# Patient Record
Sex: FEMALE | Race: WHITE | NOT HISPANIC OR LATINO | URBAN - METROPOLITAN AREA
[De-identification: names, ages, dates, MRNs, and addresses within clinical notes are randomized per-mention and may not be internally consistent; named-entity substitution may affect disease eponyms.]

---

## 2017-01-18 ENCOUNTER — OFFICE VISIT - HEALTHEAST (OUTPATIENT)
Dept: CARDIOLOGY | Facility: CLINIC | Age: 82
End: 2017-01-18

## 2017-01-18 ENCOUNTER — AMBULATORY - HEALTHEAST (OUTPATIENT)
Dept: CARDIOLOGY | Facility: CLINIC | Age: 82
End: 2017-01-18

## 2017-01-18 DIAGNOSIS — I25.10 ATHEROSCLEROSIS OF NATIVE CORONARY ARTERY OF NATIVE HEART WITHOUT ANGINA PECTORIS: ICD-10-CM

## 2017-01-18 DIAGNOSIS — R06.09 DYSPNEA ON EXERTION: ICD-10-CM

## 2017-01-18 DIAGNOSIS — I10 ESSENTIAL HYPERTENSION WITH GOAL BLOOD PRESSURE LESS THAN 130/80: ICD-10-CM

## 2017-01-18 DIAGNOSIS — I10 ESSENTIAL HYPERTENSION: ICD-10-CM

## 2017-01-18 DIAGNOSIS — Z71.6 TOBACCO ABUSE COUNSELING: ICD-10-CM

## 2017-01-18 LAB
ATRIAL RATE - MUSE: 84 BPM
DIASTOLIC BLOOD PRESSURE - MUSE: NORMAL MMHG
INTERPRETATION ECG - MUSE: NORMAL
P AXIS - MUSE: 76 DEGREES
PR INTERVAL - MUSE: 132 MS
QRS DURATION - MUSE: 120 MS
QT - MUSE: 416 MS
QTC - MUSE: 491 MS
R AXIS - MUSE: 49 DEGREES
SYSTOLIC BLOOD PRESSURE - MUSE: NORMAL MMHG
T AXIS - MUSE: 32 DEGREES
VENTRICULAR RATE- MUSE: 84 BPM

## 2017-01-18 ASSESSMENT — MIFFLIN-ST. JEOR: SCORE: 1031.43

## 2017-09-24 ENCOUNTER — COMMUNICATION - HEALTHEAST (OUTPATIENT)
Dept: CARDIOLOGY | Facility: CLINIC | Age: 82
End: 2017-09-24

## 2017-09-24 DIAGNOSIS — I10 ESSENTIAL HYPERTENSION WITH GOAL BLOOD PRESSURE LESS THAN 130/80: ICD-10-CM

## 2017-09-24 DIAGNOSIS — I25.10 ATHEROSCLEROSIS OF NATIVE CORONARY ARTERY OF NATIVE HEART WITHOUT ANGINA PECTORIS: ICD-10-CM

## 2018-01-01 ENCOUNTER — HOME CARE/HOSPICE - HEALTHEAST (OUTPATIENT)
Dept: HOSPICE | Facility: HOSPICE | Age: 83
End: 2018-01-01

## 2018-01-01 ENCOUNTER — OFFICE VISIT - HEALTHEAST (OUTPATIENT)
Dept: GERIATRICS | Facility: CLINIC | Age: 83
End: 2018-01-01

## 2018-01-01 ENCOUNTER — AMBULATORY - HEALTHEAST (OUTPATIENT)
Dept: HOSPICE | Facility: HOSPICE | Age: 83
End: 2018-01-01

## 2018-01-01 ENCOUNTER — AMBULATORY - HEALTHEAST (OUTPATIENT)
Dept: GERIATRICS | Facility: CLINIC | Age: 83
End: 2018-01-01

## 2018-01-01 ENCOUNTER — COMMUNICATION - HEALTHEAST (OUTPATIENT)
Dept: ADMINISTRATIVE | Facility: CLINIC | Age: 83
End: 2018-01-01

## 2018-01-01 DIAGNOSIS — I63.9 CVA (CEREBRAL VASCULAR ACCIDENT) (H): ICD-10-CM

## 2018-01-01 DIAGNOSIS — I63.9 CEREBROVASCULAR ACCIDENT (CVA), UNSPECIFIED MECHANISM (H): ICD-10-CM

## 2018-01-01 DIAGNOSIS — I10 HYPERTENSION: ICD-10-CM

## 2018-01-01 DIAGNOSIS — R29.6 MULTIPLE FALLS: ICD-10-CM

## 2018-01-01 DIAGNOSIS — J44.9 COPD (CHRONIC OBSTRUCTIVE PULMONARY DISEASE) (H): ICD-10-CM

## 2018-01-01 DIAGNOSIS — G25.0 ESSENTIAL TREMOR: ICD-10-CM

## 2018-01-01 DIAGNOSIS — R06.02 SOB (SHORTNESS OF BREATH): ICD-10-CM

## 2018-01-01 DIAGNOSIS — I73.9 PERIPHERAL VASCULAR DISEASE (H): ICD-10-CM

## 2018-01-01 DIAGNOSIS — E46 PROTEIN MALNUTRITION (H): ICD-10-CM

## 2018-01-01 DIAGNOSIS — I25.10 CORONARY ARTERY DISEASE: ICD-10-CM

## 2018-01-01 DIAGNOSIS — R62.7 FTT (FAILURE TO THRIVE) IN ADULT: ICD-10-CM

## 2018-01-01 DIAGNOSIS — R09.02 HYPOXIA: ICD-10-CM

## 2018-01-01 RX ORDER — HALOPERIDOL 2 MG/ML
SOLUTION ORAL
Status: SHIPPED | COMMUNITY
Start: 2018-01-01

## 2018-01-01 RX ORDER — LORAZEPAM 0.5 MG/1
0.5 TABLET ORAL EVERY 4 HOURS PRN
Status: SHIPPED | COMMUNITY
Start: 2018-01-01

## 2018-01-01 RX ORDER — BISACODYL 10 MG
10 SUPPOSITORY, RECTAL RECTAL DAILY PRN
Status: SHIPPED | COMMUNITY
Start: 2018-01-01

## 2018-01-01 RX ORDER — BISACODYL 10 MG
10 SUPPOSITORY, RECTAL RECTAL
Status: SHIPPED | COMMUNITY
Start: 2018-01-01

## 2018-01-01 RX ORDER — ATROPINE SULFATE 10 MG/ML
SOLUTION/ DROPS OPHTHALMIC
Status: SHIPPED | COMMUNITY
Start: 2018-01-01

## 2018-01-01 RX ORDER — MORPHINE SULFATE 100 MG/5ML
5 SOLUTION ORAL SEE ADMIN INSTRUCTIONS
Status: SHIPPED | COMMUNITY
Start: 2018-01-01

## 2018-01-01 RX ORDER — IPRATROPIUM BROMIDE AND ALBUTEROL SULFATE 2.5; .5 MG/3ML; MG/3ML
3 SOLUTION RESPIRATORY (INHALATION) SEE ADMIN INSTRUCTIONS
Status: SHIPPED | COMMUNITY
Start: 2018-01-01

## 2018-01-01 ASSESSMENT — MIFFLIN-ST. JEOR: SCORE: 1068.17

## 2018-10-25 ENCOUNTER — HOME CARE/HOSPICE - HEALTHEAST (OUTPATIENT)
Dept: HOSPICE | Facility: HOSPICE | Age: 83
End: 2018-10-25

## 2021-05-24 ENCOUNTER — RECORDS - HEALTHEAST (OUTPATIENT)
Dept: ADMINISTRATIVE | Facility: CLINIC | Age: 86
End: 2021-05-24

## 2021-05-28 ENCOUNTER — RECORDS - HEALTHEAST (OUTPATIENT)
Dept: ADMINISTRATIVE | Facility: CLINIC | Age: 86
End: 2021-05-28

## 2021-05-30 ENCOUNTER — RECORDS - HEALTHEAST (OUTPATIENT)
Dept: ADMINISTRATIVE | Facility: CLINIC | Age: 86
End: 2021-05-30

## 2021-05-30 VITALS — HEIGHT: 62 IN | BODY MASS INDEX: 26.5 KG/M2 | WEIGHT: 144 LBS

## 2021-06-02 VITALS — BODY MASS INDEX: 27.99 KG/M2 | HEIGHT: 62 IN | WEIGHT: 152.1 LBS

## 2021-06-08 NOTE — PROGRESS NOTES
[facility] Office Note    Assessment / Plan:    1.  Coronary artery disease.  Stable with no recent anginal episodes.  We'll recheck pharmacologic nuclear stress testing to be sure that worsening exertional dyspnea is not an anginal equivalent  2.  Hypertension.  Inadequate control at the present time.  Will increase amlodipine to 10 mg daily  3.  Tobacco abuse.  Smoking cessation was discussed at length, assistance offered but declined    Follow up 1 year if stress testing shows no significant ischemia    ______________________________________________________________________    Subjective:    I had the opportunity to see Ana Momin at the [facility]. Ana Momin is a 85 y.o. female with a history of coronary artery disease status post stent ×2 in June 2000.  She also has a significant history of chronic restrictive pulmonary disease.  She has peripheral arterial disease and a history of palpitations.      She also has a history of hypertension, her blood pressure has been somewhat resistant to treatment.  She is accompanied by her daughter, once again     He has gained 2 pounds over the last year following her recovery from her bleeding episode last year.  She walks very little, using her walker, and notes that she is quite short of breath now when reaching the top of the steps.  Her activity tolerance is decreased over the last year.  She denies any chest pain.  She does feel her heart race at times under any kind of stress or with activity.  This is generally a regular, rapid heart beat.    She's had no chest pain.  Uses a cane to avoid falling.  She denies claudication but has occasional nocturnal cramps in her legs.  She has chronic orthopnea and over the last several months has had paroxysmal nocturnal dyspnea about 4 AM.  She has no chest pain associated with this.  No awareness of palpitations She denies peripheral edema    In 2013 a pharmacologic nuclear stress  test suggested inferior ischemia.  Subsequent coronary angiography revealed moderate in-stent restenosis on the right side but no significant stenosis demonstrated throughout the coronary circulation.    ______________________________________________________________________    Problem List:  Patient Active Problem List   Diagnosis     Dyslipidemia     Coronary Artery Disease     Peripheral Vascular Disease     Osteoarthritis     Hypertension     Medical History:  Past Medical History   Diagnosis Date     COPD (chronic obstructive pulmonary disease)      Coronary artery disease      Hypertension      Surgical History:  Past Surgical History   Procedure Laterality Date     Coronary stent placement  6/2000     Reba     Social History:  Social History     Social History     Marital status:      Spouse name: N/A     Number of children: N/A     Years of education: N/A     Occupational History     Not on file.     Social History Main Topics     Smoking status: Current Every Day Smoker     Smokeless tobacco: Not on file     Alcohol use Not on file     Drug use: Not on file     Sexual activity: Not on file     Other Topics Concern     Not on file     Social History Narrative     Sleep history: Reports daytime sleepiness, and paroxysmal nocturnal dyspnea.  She takes an inhaler with some improvement when she awakens  Exercise History:  Limited walking    Review of Systems:   General: WNL  Eyes: WNL  Ears/Nose/Throat: WNL  Lungs: Shortness of Breath, Wheezing  Heart: Shortness of Breath with activity  Stomach: WNL  Bladder: WNL  Muscle/Joints: WNL  Skin: WNL  Nervous System: Loss of Balance  Mental Health: Anxiety     Blood: WNL          Family History:  Family History   Problem Relation Age of Onset     Acute Myocardial Infarction Neg Hx          Allergies:  No Known Allergies  Medications:  Current Outpatient Prescriptions   Medication Sig Dispense Refill     albuterol (PROVENTIL HFA;VENTOLIN HFA) 90 mcg/actuation  "inhaler Inhale 2 puffs every 6 (six) hours as needed for wheezing.       albuterol (PROVENTIL) 2.5 mg /3 mL (0.083 %) nebulizer solution Inhale 2.5 mg as needed.       amLODIPine (NORVASC) 5 MG tablet TAKE ONE TABLET BY MOUTH ONE TIME DAILY 90 tablet 1     aspirin 81 MG EC tablet Take 81 mg by mouth daily.        atorvastatin (LIPITOR) 80 MG tablet Take 40 mg by mouth.       cholecalciferol, vitamin D3, (VITAMIN D3) 2,000 unit cap Take 2,000 Units by mouth daily.       clopidogrel (PLAVIX) 75 mg tablet Take 75 mg by mouth daily.        ferrous sulfate (IRON) 325 (65 FE) MG tablet Take 325 mg by mouth 2 (two) times a day.        ipratropium-albuterol (DUO-NEB) 0.5-2.5 mg/3 mL nebulizer Inhale 3 mL as needed.        metoprolol succinate (TOPROL-XL) 50 MG 24 hr tablet Take 50 mg by mouth daily.       nitroglycerin (NITROSTAT) 0.4 MG SL tablet Place 0.4 mg under the tongue every 5 (five) minutes as needed.        escitalopram oxalate (LEXAPRO) 20 MG tablet Take 10 mg by mouth daily.        No current facility-administered medications for this visit.        Objective:   Wt Readings from Last 3 Encounters:   01/18/17 144 lb (65.3 kg)   12/15/15 142 lb (64.4 kg)     Vital signs:  Visit Vitals     /64 (Patient Site: Right Arm, Patient Position: Sitting, Cuff Size: Adult Regular)     Pulse 80     Resp 16     Ht 5' 2\" (1.575 m)     Wt 144 lb (65.3 kg)     BMI 26.34 kg/m2         Physical Exam:    Eyes    Conjunctiva Findings: bilateral hyperemia.   Sclerae: Clear, nonicteric.   ENT   Mouth: Oral mucuous membranes are pink and moist   Neck   Carotid pulses: Carotid pulses palpaple with normal contours and symmetric, no bruits.   Jugular Veins: Normal jugular venous pressure.   Thyroid: No visible thyromegaly.   Pulmonary    No wheezing , decreased breath sounds with prolonged expiratory time.   Cardiovascular   Auscultation of heart: Regular rhythm, normal S1 and S2, no murmurs, clicks or rubs.   Pulses: Normal "   Extremities: No edema or clubbing.   Gastrointestinal   Abdomen: Non-tender, no masses noted.   Musculoskeletal   Spine: Inspection of the back showed moderate kyphosis.   Skin   Skin and subcutaneous tissue: No xanthelasma.   Neurologic   Orientation to person, place and time: Normal.   Psychiatric   Mood and affect: Normal.       Lab Results:  LIPIDS:  Lab Results   Component Value Date    CHOL 152 10/08/2013    CHOL 108 08/21/2013     Lab Results   Component Value Date    HDL 41 10/08/2013    HDL 41 08/21/2013     Lab Results   Component Value Date    LDLCALC 77 10/08/2013    LDLCALC 50 08/21/2013       ECG today shows sinus rhythm, right bundle branch block which is new versus previous exam from 3 years ago          FRANCINE HENAO MD [facility]  [phone]    This note created using Dragon voice recognition software.  Sound alike errors may have escaped editing.

## 2021-06-15 PROBLEM — R06.09 DYSPNEA ON EXERTION: Status: ACTIVE | Noted: 2017-01-18

## 2021-06-15 PROBLEM — Z71.6 TOBACCO ABUSE COUNSELING: Status: ACTIVE | Noted: 2017-01-18

## 2021-06-16 PROBLEM — R53.1 WEAKNESS: Status: ACTIVE | Noted: 2018-01-01

## 2021-06-16 PROBLEM — R29.6 FALLS: Status: ACTIVE | Noted: 2018-01-01

## 2021-06-16 PROBLEM — I63.9 CEREBRAL INFARCTION (H): Status: ACTIVE | Noted: 2018-01-01

## 2021-06-16 PROBLEM — I63.9 CVA (CEREBRAL VASCULAR ACCIDENT) (H): Status: ACTIVE | Noted: 2018-01-01

## 2021-06-16 PROBLEM — N30.00 ACUTE CYSTITIS WITHOUT HEMATURIA: Status: ACTIVE | Noted: 2018-01-01

## 2021-06-16 PROBLEM — G25.0 ESSENTIAL TREMOR: Chronic | Status: ACTIVE | Noted: 2018-01-01

## 2021-06-16 PROBLEM — J44.9 COPD (CHRONIC OBSTRUCTIVE PULMONARY DISEASE) (H): Status: ACTIVE | Noted: 2018-01-01

## 2021-06-16 PROBLEM — E46 PROTEIN MALNUTRITION (H): Status: ACTIVE | Noted: 2018-01-01

## 2021-06-20 NOTE — PROGRESS NOTES
[facility]    Facility:   [facility] [[id number]]   Code Status: DNR      CHIEF COMPLAINT/REASON FOR VISIT:  Chief Complaint   Patient presents with     Follow Up     rehab, copd.        HISTORY:      HPI: Ana is a 87 y.o. female who I was asked to see secondary to most recent hospitalization September 25, 2018 secondary to a history of multiple falls.  The CT of the head did not show any acute changes masses infarcts or hemorrhage.  There were age-related changes along with the encephalomalacia of the posterior right temporal to right occipital lobes.  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  She is on statin aspirin and Plavix.  She also did have a probable UTI was started on Ancef.  She also does have stable COPD she does have chronic respiratory failure with hypoxia does use oxygen while at home along with nebulizations and prednisone 10 mg daily.  She has a history of CAD status post cardiac stenting x2 in June 2000 continue with current medications.  She does have an essential tremor and was placed on primidone 50 mg.  The thyroid was within normal limits.  She was admitted secondary to multiple recent falls in the past 6 months.  She does have poor balance and a tendency to lean to the left side she denied any dizziness or lightheadedness no loss of consciousness.  She does live with her daughter.  Her troponins were negative.  Does have iron deficiency anemia the iron was 16 transferrin of 84.  She was normotensive and also afebrile.  She has been on the transitional care unit and after being here approximately a week and continues to be on oxygen and having shortness of breath poor balance tremors generalized weakness and failure to thrive both she and her family have decided they like to pursue hospice care secondary to her chronic medical  conditions including her end-stage COPD along with a history of CAD and a recent right subacute CVA.  I had a chance to speak with her regarding this decision and she is very much at peace with this decision to go on hospice care we did talk about the services involved as well as changing up some of her medications and she is okay with discontinuing currently in the 8 atorvastatin the ferrous sulfate vitamin D3 and aspirin.  For the shortness of breath as well as the anxiety we will give her Ativan as needed as well as Roxanol as needed.  The meantime she will continue with duo nebs 3 times a day also does have Pulmicort rest pills twice daily she is also on Ensure twice daily for protein malnutrition.  Also continue to monitor her blood pressures and overall status.  She is on furosemide 10 mg every other day that has been stable and her weight has also been stable at 152 pounds.  She is also finishing up her prednisone taper.  She is not having any discomfort at this time.  She occasionally does take an extra albuterol neb as needed.  She feels her appetite only be fair and she does feel that she is using a lot of unnecessary energy with her breathing as well as her overall status and she is very much aware of her prognosis.  In talking with the  they did mention to the family that we will be in today I have not met with family yet and when they do come and I will meet with them to discuss this plan of care as well but according to the  the family is very pleased that we would like to set up a hospice consult and that was their intention as well.    Past Medical History:   Diagnosis Date     COPD (chronic obstructive pulmonary disease) (H)      Coronary artery disease      Hypertension              Family History   Problem Relation Age of Onset     Acute Myocardial Infarction Neg Hx      Social History     Social History     Marital status:      Spouse name: N/A     Number of  children: N/A     Years of education: N/A     Social History Main Topics     Smoking status: Current Every Day Smoker     Smokeless tobacco: Current User     Alcohol use No     Drug use: No     Sexual activity: Not on file     Other Topics Concern     Not on file     Social History Narrative         Review of Systems  Currently she denies chills and fever URI or flulike symptoms headache stiff neck swollen glands unusual myalgias.  Does have a history of COPD CAD hypertension PVD generalized weakness and recent fall with a subacute rightCVA with left weakness.  .  Vitals:    10/09/18 0836   BP: 128/75   Pulse: 86   Resp: 20   Temp: 98.5  F (36.9  C)       Physical Exam    Constitutional: No distress.   HENT: Neck: Neck supple. No thyromegaly present.   Cardiovascular: Normal rate, regular rhythm and normal heart sounds.    Pulmonary/Chest: No respiratory distress. She has wheezes.   History of COPD.  Chronic wheezes.  Oxygen dependent.  Smoker.   Abdominal: Bowel sounds are normal. There is no tenderness. There is no guarding.   Musculoskeletal:   Tremors.  Generalized weakness.  Poor balance.  Recent subacute right CVA   Lymphadenopathy:     She has no cervical adenopathy.   Neurological: She is alert.   Skin: Skin is warm and dry. No rash noted.   Psychiatric: Her behavior is normal.   LABS:   Lab Results   Component Value Date    WBC 10.4 09/29/2018    HGB 13.4 09/29/2018    HCT 42.5 09/29/2018    MCV 99 09/29/2018     09/29/2018     Results for orders placed or performed during the hospital encounter of 09/25/18   Basic Metabolic Panel   Result Value Ref Range    Sodium 140 136 - 145 mmol/L    Potassium 4.3 3.5 - 5.0 mmol/L    Chloride 103 98 - 107 mmol/L    CO2 28 22 - 31 mmol/L    Anion Gap, Calculation 9 5 - 18 mmol/L    Glucose 121 70 - 125 mg/dL    Calcium 9.6 8.5 - 10.5 mg/dL    BUN 28 8 - 28 mg/dL    Creatinine 1.09 0.60 - 1.10 mg/dL    GFR MDRD Af Amer 58 (L) >60 mL/min/1.73m2    GFR MDRD Non Af  Amer 48 (L) >60 mL/min/1.73m2           ASSESSMENT:      ICD-10-CM    1. COPD (chronic obstructive pulmonary disease) (H) J44.9    2. FTT (failure to thrive) in adult R62.7    3. Hypoxia R09.02    4. SOB (shortness of breath) R06.02        PLAN:    At this time we will initiate a hospice consult per their request.  Also discontinue the atorvastatin the ferrous sulfate the vitamin D3 and aspirin will also add Ativan 0.5 mg every 6 hours as needed for anxiety and shortness of breath as well as Roxanol 0.25 mL every 2 hours as needed for pain and shortness of breath.  When the family does come and also meet with them to discuss this current plan of care also will send the face sheet [facility] to [facility] for further evaluation and consultation.  She will continue with her current nebulizations as well as other medications as listed above.  At this point the patient is pleased so far with the conversation as well as the current plan of care.    For documentation purposes total visit was over 40 minutes to which over 50% was spent with the patient going over her care her questions her medications her decision and coordination of care efforts.      Electronically signed by: Michael Duane Johnson, CNP

## 2021-06-20 NOTE — PROGRESS NOTES
Code Status:  DNR/DNI  Visit Type: H & P     Facility:  [facility] [[id number]]      Facility Type: SNF (Skilled Nursing Facility, TCU)    History of Present Illness:   Hospital Admission Date: September 25, 2018 [facility] Hospital Discharge Date: October first 2018  Facility Admission Date: October 1, 2018 [facility] transitional care unit    Ana Momin is a 87 y.o. female who has a underlying history of coronary artery disease COPD and continuation of smoking who has had fairly significant dyspnea and having multiple falls.  She had had falls 2 days prior secondary to poor balance and tendency to lean to the left side.  There was no dizziness but she often feels lightheaded.  She had developed some bruising of her arms secondary to that.    Facility course the patient had a brain MRI which showed signs of subacute ischemia involving the posterior right frontal lobe with no evidence of acute hemorrhage.  The did the best that they could in evaluating her for stabilization of her COPD as well.  Imaging did show a focus of subacute ischemia involving the posterior right frontal lobe there was no evidence of hemorrhage.  She was sent to us for rehabilitation.    Past Medical History:   Diagnosis Date     COPD (chronic obstructive pulmonary disease) (H)      Coronary artery disease      Hypertension      Past Surgical History:   Procedure Laterality Date     CORONARY STENT PLACEMENT  6/2000    Reba     Family History   Problem Relation Age of Onset     Acute Myocardial Infarction Neg Hx      Social History     Social History     Marital status:      Spouse name: N/A     Number of children: N/A     Years of education: N/A     Occupational History     Not on file.     Social History Main Topics     Smoking status: Current Every Day Smoker     Smokeless tobacco: Current User     Alcohol use No     Drug use: No     Sexual activity: Not on file     Other Topics Concern      Not on file     Social History Narrative     Additional Geriatric Review patient continues to smoke lives with her daughter has 4 children has been  for 25years.  Has been O2 dependent.  She does not drive , continent of bowel and bladder and reasonable cognition  Current Outpatient Prescriptions   Medication Sig Dispense Refill     albuterol (PROVENTIL HFA;VENTOLIN HFA) 90 mcg/actuation inhaler Inhale 2 puffs every 6 (six) hours as needed for wheezing.       albuterol (PROVENTIL) 2.5 mg /3 mL (0.083 %) nebulizer solution Inhale 2.5 mg every 4 (four) hours as needed.        amLODIPine (NORVASC) 10 MG tablet TAKE 1 TABLET BY MOUTH ONCE DAILY. 90 tablet 2     aspirin 81 MG EC tablet Take 81 mg by mouth daily.        atorvastatin (LIPITOR) 80 MG tablet Take 40 mg by mouth. Take 1/2 tablet (=40 mg) by mouth once daily.       cholecalciferol, vitamin D3, (VITAMIN D3) 2,000 unit cap Take 2,000 Units by mouth daily.       clopidogrel (PLAVIX) 75 mg tablet Take 75 mg by mouth daily.        escitalopram oxalate (LEXAPRO) 20 MG tablet Take 20 mg by mouth daily.        ferrous sulfate (IRON) 325 (65 FE) MG tablet Take 325 mg by mouth daily with breakfast.        furosemide (LASIX) 20 MG tablet Take 10 mg by mouth every other day.       ipratropium-albuterol (DUO-NEB) 0.5-2.5 mg/3 mL nebulizer Inhale 3 mL 3 (three) times a day.        metoprolol succinate (TOPROL-XL) 50 MG 24 hr tablet Take 50 mg by mouth daily.       nitroglycerin (NITROSTAT) 0.4 MG SL tablet Place 0.4 mg under the tongue every 5 (five) minutes as needed.        predniSONE (DELTASONE) 10 mg tablet Take 10 mg by mouth daily.       primidone (MYSOLINE) 50 MG tablet Take 1 tablet (50 mg total) by mouth at bedtime. Essential tremor 30 tablet 0     No current facility-administered medications for this visit.      No Known Allergies  Immunization History   Administered Date(s) Administered     Influenza high dose, seasonal 09/29/2018     Pneumo Conj 13-V  (2010&after) 02/22/2016     Pneumo Polysac 23-V 11/25/1999         Review of Systems   Constitutional: Negative for appetite change, chills, diaphoresis, fatigue, fever and unexpected weight change.   HENT: Negative for congestion, dental problem, ear pain, hearing loss, nosebleeds, sinus pressure, sore throat, tinnitus and trouble swallowing.    Eyes: Negative for photophobia, pain, discharge, itching and visual disturbance.   Respiratory: Positive for shortness of breath. Negative for apnea, cough, chest tightness and wheezing.    Cardiovascular: Negative for chest pain and palpitations.   Gastrointestinal: Negative for abdominal distention, abdominal pain, constipation and diarrhea.   Endocrine: Negative for cold intolerance, heat intolerance and polydipsia.   Genitourinary: Negative.  Negative for decreased urine volume, difficulty urinating, dysuria, enuresis, frequency, hematuria, menstrual problem, urgency and vaginal discharge.   Musculoskeletal: Negative for arthralgias, back pain and gait problem.   Allergic/Immunologic: Negative.    Neurological: Positive for weakness. Negative for dizziness, tremors, syncope, facial asymmetry, speech difficulty, light-headedness, numbness and headaches.   Hematological: Negative.    Psychiatric/Behavioral: Negative for agitation, behavioral problems, confusion, decreased concentration, dysphoric mood and hallucinations. The patient is not hyperactive.         Physical Exam   Constitutional: She is oriented to person, place, and time. She appears well-developed and well-nourished.   HENT:   Head: Normocephalic and atraumatic.   Right Ear: External ear normal.   Left Ear: External ear normal.   Nose: Nose normal.   Mouth/Throat: Oropharynx is clear and moist.   Eyes: Conjunctivae and EOM are normal. Pupils are equal, round, and reactive to light. Left eye exhibits no discharge. No scleral icterus.   Neck: Neck supple. No JVD present. No tracheal deviation present. No  thyromegaly present.   Cardiovascular: Normal rate, regular rhythm and normal heart sounds.  Exam reveals no friction rub.    No murmur heard.  Tachycardic   Pulmonary/Chest: Effort normal and breath sounds normal. No respiratory distress. She has no wheezes. She has no rales. She exhibits no tenderness.   Patient is very barrel chested very little air movement on inspiration and expiration use is more her shoulder and accessory muscles but not much internally with air movement.  No extra adventitial sounds.   Abdominal: She exhibits no distension and no mass. There is no tenderness. There is no guarding.   Musculoskeletal: Normal range of motion. She exhibits no edema or tenderness.   Lymphadenopathy:     She has no cervical adenopathy.   Neurological: She is alert and oriented to person, place, and time. She has normal reflexes. No cranial nerve deficit. She exhibits normal muscle tone. Coordination normal.   Skin: Skin is warm and dry. No rash noted. No erythema.   Psychiatric: She has a normal mood and affect. Her behavior is normal. Thought content normal.         Labs:  All labs reviewed in the nursing home record.  Recent Results (from the past 240 hour(s))   ECG 12 lead nursing unit performed   Result Value Ref Range    SYSTOLIC BLOOD PRESSURE 144 mmHg    DIASTOLIC BLOOD PRESSURE 68 mmHg    VENTRICULAR RATE 81 BPM    ATRIAL RATE 81 BPM    P-R INTERVAL 136 ms    QRS DURATION 90 ms    Q-T INTERVAL 402 ms    QTC CALCULATION (BEZET) 466 ms    P Axis 70 degrees    R AXIS 62 degrees    T AXIS 79 degrees    MUSE DIAGNOSIS       Sinus rhythm with Fusion complexes  Inferior-posterior infarct (cited on or before 18-JAN-2017)  Anterolateral infarct (cited on or before 18-JAN-2017)  Abnormal ECG  When compared with ECG of 18-JAN-2017 15:54,  Fusion complexes are now Present  Right bundle branch block is no longer Present  Questionable change in initial forces of Anterolateral leads  Questionable change in initial forces  of Inferior leads  Confirmed by MINOO LEON MD LOC:WW ([id number]) on 9/26/2018 11:02:42 AM     Basic Metabolic Panel   Result Value Ref Range    Sodium 140 136 - 145 mmol/L    Potassium 4.3 3.5 - 5.0 mmol/L    Chloride 103 98 - 107 mmol/L    CO2 28 22 - 31 mmol/L    Anion Gap, Calculation 9 5 - 18 mmol/L    Glucose 121 70 - 125 mg/dL    Calcium 9.6 8.5 - 10.5 mg/dL    BUN 28 8 - 28 mg/dL    Creatinine 1.09 0.60 - 1.10 mg/dL    GFR MDRD Af Amer 58 (L) >60 mL/min/1.73m2    GFR MDRD Non Af Amer 48 (L) >60 mL/min/1.73m2   Troponin I   Result Value Ref Range    Troponin I 0.05 0.00 - 0.29 ng/mL   HM1 (CBC with Diff)   Result Value Ref Range    WBC 10.9 4.0 - 11.0 thou/uL    RBC 4.36 3.80 - 5.40 mill/uL    Hemoglobin 13.5 12.0 - 16.0 g/dL    Hematocrit 42.5 35.0 - 47.0 %    MCV 98 80 - 100 fL    MCH 31.0 27.0 - 34.0 pg    MCHC 31.8 (L) 32.0 - 36.0 g/dL    RDW 13.0 11.0 - 14.5 %    Platelets 258 140 - 440 thou/uL    MPV 9.9 8.5 - 12.5 fL    Neutrophils % 74 (H) 50 - 70 %    Lymphocytes % 15 (L) 20 - 40 %    Monocytes % 9 2 - 10 %    Eosinophils % 2 0 - 6 %    Basophils % 0 0 - 2 %    Neutrophils Absolute 8.0 (H) 2.0 - 7.7 thou/uL    Lymphocytes Absolute 1.6 0.8 - 4.4 thou/uL    Monocytes Absolute 1.0 (H) 0.0 - 0.9 thou/uL    Eosinophils Absolute 0.2 0.0 - 0.4 thou/uL    Basophils Absolute 0.0 0.0 - 0.2 thou/uL   Urinalysis-UC if Indicated   Result Value Ref Range    Color, UA Yellow Colorless, Yellow, Straw, Light Yellow    Clarity, UA Cloudy (!) Clear    Glucose, UA Negative Negative    Bilirubin, UA Negative Negative    Ketones, UA Negative Negative    Specific Gravity, UA 1.012 1.001 - 1.030    Blood, UA Negative Negative    pH, UA 6.0 4.5 - 8.0    Protein,  mg/dL (!) Negative mg/dL    Urobilinogen, UA <2.0 E.U./dL <2.0 E.U./dL, 2.0 E.U./dL    Nitrite, UA Negative Negative    Leukocytes, UA Negative Negative    Bacteria, UA Moderate (!) None Seen hpf    RBC, UA 0-2 None Seen, 0-2 hpf    WBC, UA 0-5 None Seen, 0-5 hpf     Squam Epithel, UA 0-5 None Seen, 0-5 lpf    Mucus, UA Few (!) None Seen lpf   Culture, Urine   Result Value Ref Range    Culture Mixture of urogenital organisms    Copper, Serum   Result Value Ref Range    Copper, Serum/Plasma 109 80 - 155 ug/dL   Ceruloplasmin   Result Value Ref Range    Ceruloplasmin 37 23 - 53 mg/dL   Thyroid Stimulating Hormone (TSH)   Result Value Ref Range    TSH 2.67 0.30 - 5.00 uIU/mL   Echo Complete   Result Value Ref Range    LV volume diastolic 64.9 46 - 106 cm3    LV volume systolic 29.2 14 - 42 cm3    IVSd 0.902 0.6 - 0.9 cm    LVIDd 4.17 3.8 - 5.2 cm    LVIDs 3.03 2.2 - 3.5 cm    LVOT diam 1.9 cm    LVOT mean gradient 2 mmHg    LVOT peak VTI 17.2 cm    LVOT mean rosalia 64.5 cm/s    LVOT peak rosalia 79.1 cm/s    LVOT peak gradient 3 mmHg    LV PWd 0.824 0.6 - 0.9 cm    MV E' lat rosalia 4.79 cm/s    MV E' med rosalia 4.24 cm/s    AV mean rosalia 86.8 cm/s    AV mean gradient 4 mmHg    AV VTI 27.5 cm    AV peak rosalia 132 cm/s    LA size 4 cm    MV decel time 229 ms    MV peak A rosalia 77.6 cm/s    MV peak E rosalia 61.6 cm/s    SC peak rosalia 148 cm/s    SC peak rosalia 148 cm/s    SC peak gradient 9 mmHg    TR peak rosalia 258 cm/s    BSA 1.7 m2    Hieght 62 in    Weight 2340.8 lbs    /65 mmHg    HR 81 bpm    IVS/PW ratio 1.1     LV FS 27.3 28 - 44 %    Echo LVEF calculated 55 55 - 75 %    LV mass 110.8 g    AV area 1.8 cm2    LVOT area 2.83 cm2    LVOT SV 48.7 cm3    LV systolic volume index 17.2 11 - 31 cm3/m2    LV diastolic volume index 38.2 34 - 74 cm3/m2    LV mass index 65.2 g/m2    LV SVi 28.7 ml/m2    LV CO 3.9 l/min    LV Ci 2.3 l/min/m2    Height 62.0 in    Weight 146 lbs    AV DIM IND VTI 0.6     TR peak gradent 26.6 mmHg    LA volume 49.3 mL    AV DIM IND rosalia 0.6     MV E/A Ratio 0.8     AV peak gradient 7.0 mmHg    LA volume index 29.0 mL/m2    TAPSE 2.1 cm    MV med E/e' ratio 14.5     MV lat E/e' ratio 12.9     LA area 2 19.6 cm2    LA area 1 15.1 cm2    MV Avg E/e' Ratio 13.6 cm/s    LA length 5.1  cm    Echo LVEF Estimated 45 %   Comprehensive Metabolic Panel   Result Value Ref Range    Sodium 141 136 - 145 mmol/L    Potassium 4.7 3.5 - 5.0 mmol/L    Chloride 104 98 - 107 mmol/L    CO2 26 22 - 31 mmol/L    Anion Gap, Calculation 11 5 - 18 mmol/L    Glucose 110 70 - 125 mg/dL    BUN 32 (H) 8 - 28 mg/dL    Creatinine 1.13 (H) 0.60 - 1.10 mg/dL    GFR MDRD Af Amer 55 (L) >60 mL/min/1.73m2    GFR MDRD Non Af Amer 46 (L) >60 mL/min/1.73m2    Bilirubin, Total 0.6 0.0 - 1.0 mg/dL    Calcium 9.9 8.5 - 10.5 mg/dL    Protein, Total 5.9 (L) 6.0 - 8.0 g/dL    Albumin 3.3 (L) 3.5 - 5.0 g/dL    Alkaline Phosphatase 53 45 - 120 U/L    AST 18 0 - 40 U/L    ALT 22 0 - 45 U/L   HM1 (CBC with Diff)   Result Value Ref Range    WBC 11.4 (H) 4.0 - 11.0 thou/uL    RBC 4.34 3.80 - 5.40 mill/uL    Hemoglobin 13.5 12.0 - 16.0 g/dL    Hematocrit 42.5 35.0 - 47.0 %    MCV 98 80 - 100 fL    MCH 31.1 27.0 - 34.0 pg    MCHC 31.8 (L) 32.0 - 36.0 g/dL    RDW 13.0 11.0 - 14.5 %    Platelets 231 140 - 440 thou/uL    MPV 9.7 8.5 - 12.5 fL    Neutrophils % 74 (H) 50 - 70 %    Lymphocytes % 15 (L) 20 - 40 %    Monocytes % 8 2 - 10 %    Eosinophils % 2 0 - 6 %    Basophils % 0 0 - 2 %    Neutrophils Absolute 8.4 (H) 2.0 - 7.7 thou/uL    Lymphocytes Absolute 1.8 0.8 - 4.4 thou/uL    Monocytes Absolute 0.9 0.0 - 0.9 thou/uL    Eosinophils Absolute 0.2 0.0 - 0.4 thou/uL    Basophils Absolute 0.0 0.0 - 0.2 thou/uL   Lipid Profile   Result Value Ref Range    Triglycerides 68 <=149 mg/dL    Cholesterol 154 <=199 mg/dL    LDL Calculated 76 <=129 mg/dL    HDL Cholesterol 64 >=50 mg/dL   Comprehensive Metabolic Panel   Result Value Ref Range    Sodium 140 136 - 145 mmol/L    Potassium 4.6 3.5 - 5.0 mmol/L    Chloride 104 98 - 107 mmol/L    CO2 29 22 - 31 mmol/L    Anion Gap, Calculation 7 5 - 18 mmol/L    Glucose 101 70 - 125 mg/dL    BUN 38 (H) 8 - 28 mg/dL    Creatinine 1.23 (H) 0.60 - 1.10 mg/dL    GFR MDRD Af Amer 50 (L) >60 mL/min/1.73m2    GFR MDRD  Non Af Amer 41 (L) >60 mL/min/1.73m2    Bilirubin, Total 0.4 0.0 - 1.0 mg/dL    Calcium 9.6 8.5 - 10.5 mg/dL    Protein, Total 5.5 (L) 6.0 - 8.0 g/dL    Albumin 3.1 (L) 3.5 - 5.0 g/dL    Alkaline Phosphatase 49 45 - 120 U/L    AST 15 0 - 40 U/L    ALT 18 0 - 45 U/L   HM1 (CBC with Diff)   Result Value Ref Range    WBC 10.6 4.0 - 11.0 thou/uL    RBC 4.14 3.80 - 5.40 mill/uL    Hemoglobin 12.8 12.0 - 16.0 g/dL    Hematocrit 39.9 35.0 - 47.0 %    MCV 96 80 - 100 fL    MCH 30.9 27.0 - 34.0 pg    MCHC 32.1 32.0 - 36.0 g/dL    RDW 13.0 11.0 - 14.5 %    Platelets 215 140 - 440 thou/uL    MPV 10.0 8.5 - 12.5 fL    Neutrophils % 63 50 - 70 %    Lymphocytes % 23 20 - 40 %    Monocytes % 10 2 - 10 %    Eosinophils % 4 0 - 6 %    Basophils % 0 0 - 2 %    Neutrophils Absolute 6.7 2.0 - 7.7 thou/uL    Lymphocytes Absolute 2.5 0.8 - 4.4 thou/uL    Monocytes Absolute 1.0 (H) 0.0 - 0.9 thou/uL    Eosinophils Absolute 0.4 0.0 - 0.4 thou/uL    Basophils Absolute 0.0 0.0 - 0.2 thou/uL   Comprehensive Metabolic Panel   Result Value Ref Range    Sodium 141 136 - 145 mmol/L    Potassium 4.8 3.5 - 5.0 mmol/L    Chloride 104 98 - 107 mmol/L    CO2 30 22 - 31 mmol/L    Anion Gap, Calculation 7 5 - 18 mmol/L    Glucose 94 70 - 125 mg/dL    BUN 31 (H) 8 - 28 mg/dL    Creatinine 1.05 0.60 - 1.10 mg/dL    GFR MDRD Af Amer 60 (L) >60 mL/min/1.73m2    GFR MDRD Non Af Amer 50 (L) >60 mL/min/1.73m2    Bilirubin, Total 0.4 0.0 - 1.0 mg/dL    Calcium 9.6 8.5 - 10.5 mg/dL    Protein, Total 5.7 (L) 6.0 - 8.0 g/dL    Albumin 3.1 (L) 3.5 - 5.0 g/dL    Alkaline Phosphatase 56 45 - 120 U/L    AST 19 0 - 40 U/L    ALT 20 0 - 45 U/L   HM1 (CBC with Diff)   Result Value Ref Range    WBC 10.4 4.0 - 11.0 thou/uL    RBC 4.28 3.80 - 5.40 mill/uL    Hemoglobin 13.4 12.0 - 16.0 g/dL    Hematocrit 42.5 35.0 - 47.0 %    MCV 99 80 - 100 fL    MCH 31.3 27.0 - 34.0 pg    MCHC 31.5 (L) 32.0 - 36.0 g/dL    RDW 12.9 11.0 - 14.5 %    Platelets 223 140 - 440 thou/uL    MPV  9.9 8.5 - 12.5 fL    Neutrophils % 67 50 - 70 %    Lymphocytes % 19 (L) 20 - 40 %    Monocytes % 10 2 - 10 %    Eosinophils % 4 0 - 6 %    Basophils % 1 0 - 2 %    Neutrophils Absolute 7.0 2.0 - 7.7 thou/uL    Lymphocytes Absolute 2.0 0.8 - 4.4 thou/uL    Monocytes Absolute 1.0 (H) 0.0 - 0.9 thou/uL    Eosinophils Absolute 0.4 0.0 - 0.4 thou/uL    Basophils Absolute 0.1 0.0 - 0.2 thou/uL         Assessment:  1. Multiple falls     2. COPD (chronic obstructive pulmonary disease) (H)     3. Peripheral vascular disease (H)     4. Hypertension     5. Coronary artery disease     6. CVA (cerebral vascular accident) (H)         Plan: Patient is on a vigorous regimen but we will try to add Pulmicort Respules 1.5 mg twice daily and prednisone burst 40 mg a day for 2 days 30 mg a day for 2 days then 20 daily.  She clearly is tachycardic and easily short of breath with the above regimen.  This may be due to the beta agonist that she is receiving.  She has apparently a tremor as well but it was not prominent during my exam.    Total 45 minutes of which 65% was spent in counseling and coordination of care of the above plan.    Electronically signed by: Dallin Guardado MD

## 2021-06-20 NOTE — PROGRESS NOTES
[facility]    Facility:   [facility] [[id number]]   Code Status: DNR      CHIEF COMPLAINT/REASON FOR VISIT:  Chief Complaint   Patient presents with     Problem Visit     asked to see       HISTORY:      HPI: Ana is a 86 y.o. female who I was asked to see secondary to most recent hospitalization September 25, 2018 secondary to a history of multiple falls.  The CT of the head did not show any acute changes masses infarcts or hemorrhage.  There were age-related changes along with the encephalomalacia of the posterior right temporal to right occipital lobes.  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  She is on statin aspirin and Plavix.  She also did have a probable UTI was started on Ancef.  She also does have stable COPD she does have chronic respiratory failure with hypoxia does use oxygen while at home along with nebulizations and prednisone 10 mg daily.  She has a history of CAD status post cardiac stenting x2 in June 2000 continue with current medications.  She does have an essential tremor and was placed on primidone 50 mg.  The thyroid was within normal limits.  She was admitted secondary to multiple recent falls in the past 6 months.  She does have poor balance and a tendency to lean to the left side she denied any dizziness or lightheadedness no loss of consciousness.  She does live with her daughter.  Her troponins were negative.  Does have iron deficiency anemia the iron was 16 transferrin of 84.  She was normotensive and also afebrile.  Had a chance to talk about her chronic medical conditions as well as her now stay on the transitional care unit.  She does have COPD does have chronic wheezes and she does have nebs both albuterol and duo nebs as needed we will go and schedule the duo nebs 3 times a day can have albuterol as needed.  She is on  oxygen chronically.  We will also add a humidifier to her oxygen tank.  Appetite is okay with not the greatest and she does have protein malnutrition so we will add a protein shake twice a day.  She otherwise currently has been normotensive and afebrile.  She does admit to weakness primarily left-sided weakness and she does have minimal tremors.    Past Medical History:   Diagnosis Date     COPD (chronic obstructive pulmonary disease) (H)      Coronary artery disease      Hypertension              Family History   Problem Relation Age of Onset     Acute Myocardial Infarction Neg Hx      Social History     Social History     Marital status:      Spouse name: N/A     Number of children: N/A     Years of education: N/A     Social History Main Topics     Smoking status: Current Every Day Smoker     Smokeless tobacco: Current User     Alcohol use No     Drug use: No     Sexual activity: Not on file     Other Topics Concern     Not on file     Social History Narrative         Review of Systems  Currently she denies chills and fever URI or flulike symptoms headache stiff neck swollen glands unusual myalgias.  Does have a history of COPD CAD hypertension PVD generalized weakness and recent fall with a subacute rightCVA with left weakness.    Current Outpatient Prescriptions   Medication Sig     albuterol (PROVENTIL HFA;VENTOLIN HFA) 90 mcg/actuation inhaler Inhale 2 puffs every 6 (six) hours as needed for wheezing.     albuterol (PROVENTIL) 2.5 mg /3 mL (0.083 %) nebulizer solution Inhale 2.5 mg every 4 (four) hours as needed.      amLODIPine (NORVASC) 10 MG tablet TAKE 1 TABLET BY MOUTH ONCE DAILY.     aspirin 81 MG EC tablet Take 81 mg by mouth daily.      atorvastatin (LIPITOR) 80 MG tablet Take 40 mg by mouth. Take 1/2 tablet (=40 mg) by mouth once daily.     cholecalciferol, vitamin D3, (VITAMIN D3) 2,000 unit cap Take 2,000 Units by mouth daily.     clopidogrel (PLAVIX) 75 mg tablet Take 75 mg by mouth daily.       escitalopram oxalate (LEXAPRO) 20 MG tablet Take 20 mg by mouth daily.      ferrous sulfate (IRON) 325 (65 FE) MG tablet Take 325 mg by mouth daily with breakfast.      furosemide (LASIX) 20 MG tablet Take 10 mg by mouth every other day.     ipratropium-albuterol (DUO-NEB) 0.5-2.5 mg/3 mL nebulizer Inhale 3 mL 3 (three) times a day.      metoprolol succinate (TOPROL-XL) 50 MG 24 hr tablet Take 50 mg by mouth daily.     nitroglycerin (NITROSTAT) 0.4 MG SL tablet Place 0.4 mg under the tongue every 5 (five) minutes as needed.      predniSONE (DELTASONE) 10 mg tablet Take 10 mg by mouth daily.     primidone (MYSOLINE) 50 MG tablet Take 1 tablet (50 mg total) by mouth at bedtime. Essential tremor       .There were no vitals filed for this visit.  Blood pressure 134/79 pulse 78 respirations 16 temperature 98.0 saturations with 2 L of oxygen 95%  Physical Exam   Constitutional: No distress.   HENT:   Head: Normocephalic.   Eyes: Pupils are equal, round, and reactive to light.   Neck: Neck supple. No thyromegaly present.   Cardiovascular: Normal rate, regular rhythm and normal heart sounds.    Pulmonary/Chest: No respiratory distress. She has wheezes.   History of COPD.  Chronic wheezes.  Oxygen dependent.  Smoker.   Abdominal: Bowel sounds are normal. There is no tenderness. There is no guarding.   Musculoskeletal:   Tremors.  Generalized weakness.  Poor balance.  Recent subacute right CVA   Lymphadenopathy:     She has no cervical adenopathy.   Neurological: She is alert.   Skin: Skin is warm and dry. No rash noted.   Psychiatric: Her behavior is normal.         LABS:   Lab Results   Component Value Date    WBC 10.4 09/29/2018    HGB 13.4 09/29/2018    HCT 42.5 09/29/2018    MCV 99 09/29/2018     09/29/2018     Results for orders placed or performed during the hospital encounter of 09/25/18   Basic Metabolic Panel   Result Value Ref Range    Sodium 140 136 - 145 mmol/L    Potassium 4.3 3.5 - 5.0 mmol/L     Chloride 103 98 - 107 mmol/L    CO2 28 22 - 31 mmol/L    Anion Gap, Calculation 9 5 - 18 mmol/L    Glucose 121 70 - 125 mg/dL    Calcium 9.6 8.5 - 10.5 mg/dL    BUN 28 8 - 28 mg/dL    Creatinine 1.09 0.60 - 1.10 mg/dL    GFR MDRD Af Amer 58 (L) >60 mL/min/1.73m2    GFR MDRD Non Af Amer 48 (L) >60 mL/min/1.73m2       No results found for: HGBA1C  Lab Results   Component Value Date    ALT 20 09/29/2018    AST 19 09/29/2018    ALKPHOS 56 09/29/2018    BILITOT 0.4 09/29/2018     Lab Results   Component Value Date    ALBUMIN 3.1 (L) 09/29/2018     Lab Results   Component Value Date    TSH 2.67 09/26/2018   ]      ASSESSMENT:      ICD-10-CM    1. Cerebrovascular accident (CVA), unspecified mechanism (H) I63.9    2. Essential tremor G25.0    3. COPD (chronic obstructive pulmonary disease) (H) J44.9    4. Protein malnutrition (H) E46        PLAN:    At this time adding an extra protein supplement.  Changing the duo nebs to schedule III times a day.  Putting a bubbler or humidifier onto her oxygen tank for comfort as well as humidity.  She is aware of the therapy process and how to work through this process.  She will continue to work with therapy department she does feel confident that she will improve with her strength and overall conditioning but she has a lot to work through regarding her history of falls and poor balance.  She had no further questions.    For documentation purposes total visit was over 40 minutes to which over 50% was spent with the patient going over her care her hospitalization her medications and coordination of care efforts.      Electronically signed by: Michael Duane Johnson, CNP